# Patient Record
Sex: MALE | Race: ASIAN | URBAN - METROPOLITAN AREA
[De-identification: names, ages, dates, MRNs, and addresses within clinical notes are randomized per-mention and may not be internally consistent; named-entity substitution may affect disease eponyms.]

---

## 2023-05-17 ENCOUNTER — OFFICE VISIT (OUTPATIENT)
Dept: URBAN - METROPOLITAN AREA CLINIC 23 | Facility: CLINIC | Age: 68
End: 2023-05-17
Payer: COMMERCIAL

## 2023-05-17 DIAGNOSIS — H25.813 COMBINED FORMS OF AGE-RELATED CATARACT, BILATERAL: ICD-10-CM

## 2023-05-17 DIAGNOSIS — E11.3293 TYPE 2 DIAB W MILD NONPRLF DIABETIC RTNOP W/O MACULAR EDEMA, BILATERAL: Primary | ICD-10-CM

## 2023-05-17 PROCEDURE — 92134 CPTRZ OPH DX IMG PST SGM RTA: CPT | Performed by: OPTOMETRIST

## 2023-05-17 PROCEDURE — 99204 OFFICE O/P NEW MOD 45 MIN: CPT | Performed by: OPTOMETRIST

## 2023-05-17 ASSESSMENT — INTRAOCULAR PRESSURE
OD: 18
OS: 18

## 2023-05-17 ASSESSMENT — KERATOMETRY
OD: 44.00
OS: 43.88

## 2023-05-17 NOTE — IMPRESSION/PLAN
Impression: Combined forms of age-related cataract, bilateral: H25.813. Plan: Cataracts account for the patient's complaints. Discussed diagnosis in detail with patient. Discussed all R/B's and procedures. Patient understands changing glasses prescription will not significantly improve vision. Patient elects to have surgery, phacoemulsification with intraocular lens recommended. Discussed lens options of Toric/Multifocal, or Standard lens, RL2. Will refer to cataract surgeon for pre-operative evaluation. 

****Patient needs to have retina consult prior to CAT sx****

## 2023-05-17 NOTE — IMPRESSION/PLAN
Impression: Type 2 diab w mild nonprlf diabetic rtnop w/o macular edema, bilateral: T00.5106. Plan: Discussed diagnosis in detail with patient. Advised and emphasized patient of blood sugar control. Discussed risks of progression. Poor compliance can lead to blindness. Optos performed and reviewed with patient today. OCT shows central traction. Reassured patient of condition and treatment. Refer to Dr. Manisha Hairston for retina consult prior to CAT sx.

## 2023-06-05 ENCOUNTER — OFFICE VISIT (OUTPATIENT)
Dept: URBAN - METROPOLITAN AREA CLINIC 28 | Facility: CLINIC | Age: 68
End: 2023-06-05
Payer: COMMERCIAL

## 2023-06-05 DIAGNOSIS — E11.3493 TYPE 2 DIAB WITH SEVERE NONP RTNOP WITHOUT MACULAR EDEMA, BI: ICD-10-CM

## 2023-06-05 DIAGNOSIS — H43.823 VITREOMACULAR TRACTION OF BILATERAL EYE: Primary | ICD-10-CM

## 2023-06-05 PROCEDURE — 92134 CPTRZ OPH DX IMG PST SGM RTA: CPT | Performed by: OPHTHALMOLOGY

## 2023-06-05 PROCEDURE — 92004 COMPRE OPH EXAM NEW PT 1/>: CPT | Performed by: OPHTHALMOLOGY

## 2023-06-05 ASSESSMENT — INTRAOCULAR PRESSURE
OD: 18
OS: 18

## 2023-06-05 NOTE — IMPRESSION/PLAN
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 Bilateral. Condition: new prob, no addtl w/u needed. Plan: Discussed diagnosis in detail with patient. Exam shows VMT OU, no obvious PDR PU. No treatment is required at this time. Recommend a Cataract evaluation for consideration of surgery. Recommend a retina follow - up post Cataract surgery OU.

## 2023-06-05 NOTE — IMPRESSION/PLAN
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 Bilateral. Condition: stable. Plan: Discussed diagnosis in detail with patient. Exam shows minimal Diabetic changes OU. No treatment is recommended at this time. Emphasized blood sugar control and advised to keep future appointments with PCP and/or Endocrinologist for the management of Diabetes. Recommend observation for now.

## 2023-06-27 ENCOUNTER — OFFICE VISIT (OUTPATIENT)
Dept: URBAN - METROPOLITAN AREA CLINIC 23 | Facility: CLINIC | Age: 68
End: 2023-06-27
Payer: COMMERCIAL

## 2023-06-27 DIAGNOSIS — H43.823 VITREOMACULAR TRACTION OF BILATERAL EYE: ICD-10-CM

## 2023-06-27 DIAGNOSIS — H25.813 COMBINED FORMS OF AGE-RELATED CATARACT, BILATERAL: Primary | ICD-10-CM

## 2023-06-27 PROCEDURE — 99204 OFFICE O/P NEW MOD 45 MIN: CPT | Performed by: OPHTHALMOLOGY

## 2023-06-27 RX ORDER — OFLOXACIN 3 MG/ML
0.3 % SOLUTION/ DROPS OPHTHALMIC
Qty: 5 | Refills: 1 | Status: ACTIVE
Start: 2023-06-27

## 2023-06-27 RX ORDER — KETOROLAC TROMETHAMINE 5 MG/ML
0.5 % SOLUTION OPHTHALMIC
Qty: 5 | Refills: 1 | Status: ACTIVE
Start: 2023-06-27

## 2023-06-27 RX ORDER — PREDNISOLONE ACETATE 10 MG/ML
1 % SUSPENSION/ DROPS OPHTHALMIC
Qty: 10 | Refills: 1 | Status: ACTIVE
Start: 2023-06-27

## 2023-06-27 ASSESSMENT — INTRAOCULAR PRESSURE
OD: 14
OS: 14

## 2023-06-27 ASSESSMENT — VISUAL ACUITY
OS: 20/60
OD: 20/50

## 2023-06-27 ASSESSMENT — KERATOMETRY
OS: 43.63
OD: 44.38

## 2023-06-27 NOTE — IMPRESSION/PLAN
Impression: Combined forms of age-related cataract, bilateral: H25.813. Condition: established, worsening. Symptoms: could improve with surgery. Plan: Cataract accounts for patient's complaints. Reviewed risks, benefits, and procedure. Patient desires surgery, schedule ce/iol OD then OS, RL2, standard lens, distance refractive target, patient is clear for surgery in [place] [id number]. Pt to use NSAID for 6-8 weeks after surgery. Advised pt visual improvement may be limited due to VMT. Pt aware and would like to proceed.

## 2023-06-27 NOTE — IMPRESSION/PLAN
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 Bilateral. Condition: new prob, no addtl w/u needed. Plan: Pt to follow up with retina after CEIOL OU. Recommend using NSAID after cataract surgery.

## 2023-08-07 ENCOUNTER — PRE-OPERATIVE VISIT (OUTPATIENT)
Dept: URBAN - METROPOLITAN AREA CLINIC 23 | Facility: CLINIC | Age: 68
End: 2023-08-07
Payer: COMMERCIAL

## 2023-08-07 DIAGNOSIS — H25.813 COMBINED FORMS OF AGE-RELATED CATARACT, BILATERAL: Primary | ICD-10-CM

## 2023-08-07 ASSESSMENT — PACHYMETRY
OD: 4.88
OD: 23.21
OS: 5.04
OS: 23.00

## 2023-08-16 ENCOUNTER — SURGERY (OUTPATIENT)
Dept: URBAN - METROPOLITAN AREA SURGERY 11 | Facility: SURGERY | Age: 68
End: 2023-08-16
Payer: COMMERCIAL

## 2023-08-16 PROCEDURE — 66984 XCAPSL CTRC RMVL W/O ECP: CPT | Performed by: OPHTHALMOLOGY

## 2023-08-17 ENCOUNTER — POST-OPERATIVE VISIT (OUTPATIENT)
Dept: URBAN - METROPOLITAN AREA CLINIC 23 | Facility: CLINIC | Age: 68
End: 2023-08-17
Payer: COMMERCIAL

## 2023-08-17 DIAGNOSIS — Z48.810 ENCOUNTER FOR SURGICAL AFTERCARE FOLLOWING SURGERY ON A SENSE ORGAN: Primary | ICD-10-CM

## 2023-08-17 PROCEDURE — 99024 POSTOP FOLLOW-UP VISIT: CPT | Performed by: OPTOMETRIST

## 2023-08-17 ASSESSMENT — INTRAOCULAR PRESSURE
OS: 14
OD: 22

## 2023-08-24 ENCOUNTER — POST-OPERATIVE VISIT (OUTPATIENT)
Dept: URBAN - METROPOLITAN AREA CLINIC 23 | Facility: CLINIC | Age: 68
End: 2023-08-24
Payer: COMMERCIAL

## 2023-08-24 DIAGNOSIS — Z48.810 ENCOUNTER FOR SURGICAL AFTERCARE FOLLOWING SURGERY ON A SENSE ORGAN: Primary | ICD-10-CM

## 2023-08-24 PROCEDURE — 99024 POSTOP FOLLOW-UP VISIT: CPT | Performed by: OPTOMETRIST

## 2023-08-24 ASSESSMENT — INTRAOCULAR PRESSURE
OD: 15
OS: 15

## 2023-08-24 ASSESSMENT — KERATOMETRY
OD: 43.00
OS: 44.13

## 2023-08-24 ASSESSMENT — VISUAL ACUITY: OD: 20/40

## 2023-09-06 ENCOUNTER — SURGERY (OUTPATIENT)
Dept: URBAN - METROPOLITAN AREA SURGERY 11 | Facility: SURGERY | Age: 68
End: 2023-09-06
Payer: COMMERCIAL

## 2023-09-06 PROCEDURE — 66984 XCAPSL CTRC RMVL W/O ECP: CPT | Performed by: OPHTHALMOLOGY

## 2023-09-07 ENCOUNTER — POST-OPERATIVE VISIT (OUTPATIENT)
Dept: URBAN - METROPOLITAN AREA CLINIC 23 | Facility: CLINIC | Age: 68
End: 2023-09-07
Payer: COMMERCIAL

## 2023-09-07 DIAGNOSIS — Z96.1 PRESENCE OF INTRAOCULAR LENS: Primary | ICD-10-CM

## 2023-09-07 PROCEDURE — 99024 POSTOP FOLLOW-UP VISIT: CPT | Performed by: OPTOMETRIST

## 2023-09-07 ASSESSMENT — INTRAOCULAR PRESSURE
OD: 12
OS: 16

## 2023-09-14 ENCOUNTER — POST-OPERATIVE VISIT (OUTPATIENT)
Dept: URBAN - METROPOLITAN AREA CLINIC 23 | Facility: CLINIC | Age: 68
End: 2023-09-14
Payer: COMMERCIAL

## 2023-09-14 DIAGNOSIS — Z96.1 PRESENCE OF INTRAOCULAR LENS: Primary | ICD-10-CM

## 2023-09-14 PROCEDURE — 99024 POSTOP FOLLOW-UP VISIT: CPT | Performed by: OPTOMETRIST

## 2023-09-14 ASSESSMENT — INTRAOCULAR PRESSURE
OD: 14
OS: 15

## 2023-10-17 ENCOUNTER — POST-OPERATIVE VISIT (OUTPATIENT)
Dept: URBAN - METROPOLITAN AREA CLINIC 23 | Facility: CLINIC | Age: 68
End: 2023-10-17
Payer: COMMERCIAL

## 2023-10-17 DIAGNOSIS — Z96.1 PRESENCE OF INTRAOCULAR LENS: ICD-10-CM

## 2023-10-17 DIAGNOSIS — H52.4 PRESBYOPIA: Primary | ICD-10-CM

## 2023-10-17 PROCEDURE — 92015 DETERMINE REFRACTIVE STATE: CPT | Performed by: OPTOMETRIST

## 2023-10-17 PROCEDURE — 99024 POSTOP FOLLOW-UP VISIT: CPT | Performed by: OPTOMETRIST

## 2023-10-17 ASSESSMENT — KERATOMETRY
OS: 44.00
OD: 44.38

## 2023-10-17 ASSESSMENT — INTRAOCULAR PRESSURE
OD: 16
OS: 18

## 2025-05-13 ENCOUNTER — OFFICE VISIT (OUTPATIENT)
Dept: URBAN - METROPOLITAN AREA CLINIC 23 | Facility: CLINIC | Age: 70
End: 2025-05-13
Payer: COMMERCIAL

## 2025-05-13 DIAGNOSIS — E11.3593 TYPE 2 DIABETES MELLITUS WITH PROLIFERATIVE DIABETIC RETINOPATHY WITHOUT MACULAR EDEMA, BILATERAL: Primary | ICD-10-CM

## 2025-05-13 DIAGNOSIS — H43.11 VITREOUS HEMORRHAGE, RIGHT EYE: ICD-10-CM

## 2025-05-13 PROCEDURE — 92134 CPTRZ OPH DX IMG PST SGM RTA: CPT | Performed by: OPHTHALMOLOGY

## 2025-05-13 PROCEDURE — 99214 OFFICE O/P EST MOD 30 MIN: CPT | Performed by: OPHTHALMOLOGY

## 2025-05-13 ASSESSMENT — INTRAOCULAR PRESSURE
OS: 18
OD: 16

## 2025-05-23 ENCOUNTER — OFFICE VISIT (OUTPATIENT)
Dept: URBAN - METROPOLITAN AREA CLINIC 23 | Facility: CLINIC | Age: 70
End: 2025-05-23
Payer: COMMERCIAL

## 2025-05-23 DIAGNOSIS — E11.3593 TYPE 2 DIABETES MELLITUS WITH PROLIFERATIVE DIABETIC RETINOPATHY WITHOUT MACULAR EDEMA, BILATERAL: Primary | ICD-10-CM

## 2025-05-23 DIAGNOSIS — H43.11 VITREOUS HEMORRHAGE, RIGHT EYE: ICD-10-CM

## 2025-08-01 ENCOUNTER — OFFICE VISIT (OUTPATIENT)
Dept: URBAN - METROPOLITAN AREA CLINIC 23 | Facility: CLINIC | Age: 70
End: 2025-08-01
Payer: COMMERCIAL

## 2025-08-01 DIAGNOSIS — H43.11 VITREOUS HEMORRHAGE, RIGHT EYE: ICD-10-CM

## 2025-08-01 DIAGNOSIS — E11.3593 TYPE 2 DIAB WITH PROLIF DIAB RTNOP WITHOUT MACULAR EDEMA, BI: Primary | ICD-10-CM

## 2025-08-01 PROCEDURE — 67028 INJECTION EYE DRUG: CPT | Performed by: OPHTHALMOLOGY

## 2025-08-01 PROCEDURE — 92134 CPTRZ OPH DX IMG PST SGM RTA: CPT | Performed by: OPHTHALMOLOGY

## 2025-08-01 PROCEDURE — 99213 OFFICE O/P EST LOW 20 MIN: CPT | Performed by: OPHTHALMOLOGY

## 2025-08-01 ASSESSMENT — INTRAOCULAR PRESSURE
OD: 16
OS: 16

## 2025-08-20 ENCOUNTER — SURGERY (OUTPATIENT)
Facility: LOCATION | Age: 70
End: 2025-08-20
Payer: COMMERCIAL

## 2025-08-20 PROCEDURE — 67228 TREATMENT X10SV RETINOPATHY: CPT | Performed by: OPHTHALMOLOGY

## 2025-08-29 ENCOUNTER — POST-OPERATIVE VISIT (OUTPATIENT)
Dept: URBAN - METROPOLITAN AREA CLINIC 23 | Facility: CLINIC | Age: 70
End: 2025-08-29
Payer: COMMERCIAL

## 2025-08-29 DIAGNOSIS — Z48.810 ENCOUNTER FOR SURGICAL AFTERCARE FOLLOWING SURGERY ON A SENSE ORGAN: Primary | ICD-10-CM

## 2025-08-29 PROCEDURE — 99024 POSTOP FOLLOW-UP VISIT: CPT | Performed by: OPTOMETRIST

## 2025-08-29 ASSESSMENT — INTRAOCULAR PRESSURE
OS: 20
OD: 22

## 2025-08-29 ASSESSMENT — KERATOMETRY
OD: 44.25
OS: 44.38